# Patient Record
Sex: MALE | Race: WHITE | Employment: FULL TIME | URBAN - METROPOLITAN AREA
[De-identification: names, ages, dates, MRNs, and addresses within clinical notes are randomized per-mention and may not be internally consistent; named-entity substitution may affect disease eponyms.]

---

## 2017-09-21 PROBLEM — N34.2 URETHRITIS: Status: ACTIVE | Noted: 2017-09-21

## 2017-09-21 PROBLEM — N50.82 SCROTAL PAIN: Status: ACTIVE | Noted: 2017-09-21

## 2020-09-11 ENCOUNTER — HOSPITAL ENCOUNTER (OUTPATIENT)
Dept: PHYSICAL THERAPY | Age: 25
Discharge: HOME | End: 2020-09-11
Payer: COMMERCIAL

## 2020-09-11 PROCEDURE — 97161 PT EVAL LOW COMPLEX 20 MIN: CPT

## 2020-09-11 PROCEDURE — 97110 THERAPEUTIC EXERCISES: CPT

## 2020-09-11 PROCEDURE — 97140 MANUAL THERAPY 1/> REGIONS: CPT

## 2020-09-11 NOTE — PROGRESS NOTES
[address] [facility] PHYSICAL THERAPY AT [address] 95 [facility], [address] [place] [address], [address], [address] [place] [address] Ln - Phone: [phone]  Fax: [phone] / 8019 [facility]  Patient Name: Mahendra Grier : 1995   Medical   Diagnosis: Left shoulder pain [M25.512] Treatment Diagnosis: S/p L shoulder posterior stabilization surgery   Onset Date: 20 date of surgery     Referral Source: [facility]* Start of Care [facility]): 2020   Prior Hospitalization: See medical history Provider #: [id number]   Prior Level of Function: Pain/ popping with use of L UE for ADLs   Comorbidities: na   Medications: Verified on Patient Summary List   The Plan of Care and following information is based on the information from the initial evaluation.   ================================================================  Assessment / key information: Mahendra Grier is a 22 y.o.  yo male with Dx of Left shoulder pain [M25.512]. He reports surgery on 20 for posterior shoulder stabilization    Observation: minimal swelling/ bruising. Surgical portals covered with new sterile bandage. Appear well healing.   ROM measures: not assessed due to precautions  MMT measures: not assessed due to precautions  Special tests: na  FOTO score= 29%.  ===========================================================  Eval Complexity: History LOW Complexity : Zero comorbidities / personal factors that will impact the outcome / POC;  Examination  LOW Complexity : 1-2 Standardized tests and measures addressing body structure, function, activity limitation and / or participation in recreation ; Presentation LOW Complexity : Stable, uncomplicated ;  Decision Making MEDIUM Complexity : FOTO score of 26-74; Overall Complexity LOW   Problem List: pain affecting function, decrease ROM, decrease strength, edema affecting function, decrease ADL/ functional abilitiies, decrease activity tolerance and decrease flexibility/ joint mobility   Treatment Plan may include any combination of the following: Therapeutic exercise, Therapeutic activities, Neuromuscular re-education, Physical agent/modality, Manual therapy and Patient education  Patient / Family readiness to learn indicated by: asking questions, trying to perform skills and interest  Persons(s) to be included in education: patient (P)  Barriers to Learning/Limitations: None  Measures taken, if barriers to learning:    Patient Goal (s): Strength and ROM   Patient self reported health status: excellent  Rehabilitation Potential: excellent     Short Term Goals: To be accomplished in  4  weeks:  1 Patient will report >= 25% improvement in symptoms with ADLs  2 Patient will be educated in appropriate ROM exercises/ precautions.  Long Term Goals: To be accomplished in  6-12  weeks:  1 Patient to report >= 75% improvement in symptoms with ADLs. 2 Patient will be independent with finalized HEP/ self maintenance. 3 Increase FOTO score >= 75% to indicate improved function with use of L UE.  4 Restore full AROM for improved ADL participation. 5 increase L UE strength >= 4+/5 for [place] reach and RTW. Frequency / Duration:   Patient to be seen  2  times per week for 6-12  weeks:  Patient / Caregiver education and instruction: self care, activity modification, brace/ splint application and exercises    Therapist Signature: Lizz Alford PT Date: 9/55/2826   Certification Period:  Time: 12:05 PM   ===========================================================  I certify that the above Physical Therapy Services are being furnished while the patient is under my care. I agree with the treatment plan and certify that this therapy is necessary. Physician Signature:        Date:       Time:     Please sign and return to [facility] or you may fax the signed copy to [phone]. Thank you.

## 2020-09-11 NOTE — PROGRESS NOTES
PHYSICAL THERAPY - DAILY TREATMENT NOTE     Patient Name: Manuel Malik        Date: 2020  : 1995   YES Patient  Verified  Visit #:   1     Insurance: Payor: Luciana Province / Plan: Jey Schmid RPN / Product Type: Commerical /      In time: 1130 Out time: 1210   Total Treatment Time: 40     Medicare/BCBS Time Tracking (below)   Total Timed Codes (min):   1:1 Treatment Time:       TREATMENT AREA =  Left shoulder pain [M25.512]    SUBJECTIVE    Pain Level (on 0 to 10 scale):  6  / 10   Medication Changes/New allergies or changes in medical history, any new surgeries or procedures?     NO    If yes, update Summary List   Subjective Functional Status/Changes:  []  No changes reported     See eval /POC         OBJECTIVE  Modalities Rationale:     decrease pain to improve patient's ability to return to PLOF      min [] Estim, type/location:                                      []  att     []  unatt     []  w/US     []  w/ice    []  w/heat    min []  Mechanical Traction: type/lbs                   []  pro   []  sup   []  int   []  cont    []  before manual    []  after manual    min []  Ultrasound, settings/location:      min []  Iontophoresis w/ dexamethasone, location:                                               []  take home patch       []  in clinic   10 min [x]  Ice     []  Heat    location/position:     min []  Vasopneumatic Device, press/temp:     min []  Other:    [x] Skin assessment post-treatment (if applicable):    [x]  intact    [x]  redness- no adverse reaction     []redness  adverse reaction:      10 min Therapeutic Exercise:  [x]  See flow sheet   Rationale:      increase ROM and increase strength to improve the patients ability to return to PLOF     10 min Manual Therapy: Technique:      [x] S/DTM []IASTM []PROM [] Passive Stretching   [x]manual TPR    []Jt manipulation:Gr I [] II []  III [] IV[]  []REIL with manual OP  Treatment Area: scap/ bicep region    Rationale:      decrease pain, increase ROM, increase tissue extensibility and decrease trigger points to improve patient's ability to return to PLOF     min Neuromuscular Re-ed: [x]  See flow sheet   Rationale:      improve coordination, improve balance, increase proprioception and dec dizziness to improve the patients ability to return to PLOF        min Self Care:    Rationale:    increase ROM, increase strength and improve coordination to improve the patients ability to return to PLOF    Billed With/As:   [x] TE   [] TA   [] Neuro   [] Self Care Patient Education: [x] Review HEP    [] Progressed/Changed HEP based on:   [] positioning   [] body mechanics   [] transfers   [] heat/ice application    [] other:        Other Objective/Functional Measures:    See eval/ POC     Post Treatment Pain Level (on 0 to 10) scale:   <6  / 10     ASSESSMENT    X  See POC     PLAN    [x]  Upgrade activities as tolerated {YES) Continue plan of care   []  Discharge due to :    []  Other:      Therapist: Micaela Porter PT    Date: 9/11/2020 Time: 12:03 PM   No future appointments.

## 2020-09-14 ENCOUNTER — HOSPITAL ENCOUNTER (OUTPATIENT)
Dept: PHYSICAL THERAPY | Age: 25
Discharge: HOME | End: 2020-09-14
Payer: COMMERCIAL

## 2020-09-14 PROCEDURE — 97140 MANUAL THERAPY 1/> REGIONS: CPT

## 2020-09-14 PROCEDURE — 97110 THERAPEUTIC EXERCISES: CPT

## 2020-09-14 NOTE — PROGRESS NOTES
PHYSICAL THERAPY - DAILY TREATMENT NOTE     Patient Name: Corinne Armando        Date: 2020  : 1995   YES Patient  Verified  Visit #:   2     Insurance: Payor: Elena Hanley / Plan: Pairs Stone RPN / Product Type: Commerical /      In time: 845 Out time: 920   Total Treatment Time: 35     Medicare/BCBS Time Tracking (below)   Total Timed Codes (min):   1:1 Treatment Time:       TREATMENT AREA =  Left shoulder pain [M25.512]    SUBJECTIVE    Pain Level (on 0 to 10 scale):  4  / 10   Medication Changes/New allergies or changes in medical history, any new surgeries or procedures?     NO    If yes, update Summary List   Subjective Functional Status/Changes:  []  No changes reported   Not too bad           OBJECTIVE  Modalities Rationale:     decrease inflammation and decrease pain to improve patient's ability to perform ADLs as allowed      min [] Estim, type/location:                                      []  att     []  unatt     []  w/US     []  w/ice    []  w/heat    min []  Mechanical Traction: type/lbs                   []  pro   []  sup   []  int   []  cont    []  before manual    []  after manual    min []  Ultrasound, settings/location:      min []  Iontophoresis w/ dexamethasone, location:                                               []  take home patch       []  in clinic   10 min [x]  Ice     []  Heat    location/position:     min []  Vasopneumatic Device, press/temp:     min []  Other:    [x] Skin assessment post-treatment (if applicable):    [x]  intact    [x]  redness- no adverse reaction     []redness  adverse reaction:      15 min Manual Therapy: Technique:      [x] S/DTM []IASTM []PROM [] Passive Stretching   [x]manual TPR  [] SOR [] man traction  []Jt manipulation:Gr I [] II []  III [] IV[]   [] OP with REIL    []   Treatment Area:  L scap/ bicep region   Rationale:      decrease pain, increase ROM, increase tissue extensibility and decrease trigger points to improve patient's ability to perform ADLs as allowed by protocol    10 min Therapeutic Exercise:  [x]  See flow sheet   Rationale:      increase ROM and increase strength to improve the patients ability to perfrom ADLs as allowed by protocol         Billed With/As:   [] TE   [] TA   [] Neuro   [] Self Care Patient Education: [x] Review HEP    [] Progressed/Changed HEP based on:   [] positioning   [] body mechanics   [] transfers   [] heat/ice application    [] other:        Other Objective/Functional Measures: Well healing surgical portals  TP in UT/ biceps    Compliant with precautions   Post Treatment Pain Level (on 0 to 10) scale:   3  / 10     ASSESSMENT  Assessment/Changes in Function:      Functional improvement:  compliant with precautions  Functional limitation:  active use of L shoulder           Patient will continue to benefit from skilled PT services to modify and progress therapeutic interventions, address functional mobility deficits, address ROM deficits, address strength deficits and analyze and address soft tissue restrictions to attain remaining goals.    Progress toward goals / Updated goals:    Good Progress to    [x] STG    [] LTG  2 as shown by pt report/ demo         []  See Progress Note/Recertification    PLAN    [x]  Upgrade activities as tolerated {YES) Continue plan of care   []  Discharge due to :    []  Other:      Therapist: Xu Franco PT    Date: 9/14/2020 Time: 9:09 AM     Future Appointments   Date Time Provider Tammy Bey   9/23/2020  8:00 AM Fraser Krabbe, PT [facility]   9/28/2020  9:30 AM Fraser Krabbe, PT [facility]   9/30/2020 12:00 PM Fraser Krabbe, PT [facility]   10/7/2020  8:45 AM Fraser Krabbe, PT [facility]   10/12/2020  9:30 AM Fraser Krabbe, PT [facility]   10/14/2020  8:45 AM Fraser Krabbe, PT [facility]   10/19/2020  9:30 AM Fraser Krabbe, PT [facility]   10/21/2020  8:45 AM Fraser Krabbe, PT [facility]

## 2020-09-23 ENCOUNTER — HOSPITAL ENCOUNTER (OUTPATIENT)
Dept: PHYSICAL THERAPY | Age: 25
Discharge: HOME | End: 2020-09-23
Payer: COMMERCIAL

## 2020-09-23 PROCEDURE — 97140 MANUAL THERAPY 1/> REGIONS: CPT

## 2020-09-23 PROCEDURE — 97110 THERAPEUTIC EXERCISES: CPT

## 2020-09-23 NOTE — PROGRESS NOTES
PHYSICAL THERAPY - DAILY TREATMENT NOTE     Patient Name: Mahendra Grier        Date: 2020  : 1995   YES Patient  Verified  Visit #:   3    Insurance: Payor: Jaleesa Flores / Plan: Qian SANTIAGO / Product Type: Commerical /      In time: 800 Out time: 835   Total Treatment Time: 35     Medicare/BCBS Time Tracking (below)   Total Timed Codes (min):   1:1 Treatment Time:       TREATMENT AREA =  Left shoulder pain [M25.512]    SUBJECTIVE    Pain Level (on 0 to 10 scale):  1  / 10   Medication Changes/New allergies or changes in medical history, any new surgeries or procedures? NO    If yes, update Summary List   Subjective Functional Status/Changes:  []  No changes reported   Saw PA 2 days ago.   Able to come out of the sling at home when lounging           OBJECTIVE  Modalities Rationale:     decrease inflammation and decrease pain to improve patient's ability to perform ADLs with dec pain      min [] Estim, type/location:                                      []  att     []  unatt     []  w/US     []  w/ice    []  w/heat    min []  Mechanical Traction: type/lbs                   []  pro   []  sup   []  int   []  cont    []  before manual    []  after manual    min []  Ultrasound, settings/location:      min []  Iontophoresis w/ dexamethasone, location:                                               []  take home patch       []  in clinic   10 min [x]  Ice     []  Heat    location/position:     min []  Vasopneumatic Device, press/temp:     min []  Other:    [x] Skin assessment post-treatment (if applicable):    [x]  intact    []  redness- no adverse reaction     []redness  adverse reaction:      15 min Manual Therapy: Technique:      [x] S/DTM []IASTM []PROM [] Passive Stretching   [x]manual TPR  [] SOR [] man traction  []Jt manipulation:Gr I [] II []  III [] IV[]   [] OP with REIL    []   Treatment Area:  L shoulder area   Rationale:      decrease pain, increase ROM, increase tissue extensibility and decrease trigger points to improve patient's ability to perform ADLs as allowed    10 min Therapeutic Exercise:  [x]  See flow sheet   Rationale:      increase ROM and increase strength to improve the patients ability to use arm as allowed         Billed With/As:   [x] TE   [] TA   [] Neuro   [] Self Care Patient Education: [x] Review HEP    [] Progressed/Changed HEP based on:   [] positioning   [] body mechanics   [] transfers   [] heat/ice application    [] other:        Other Objective/Functional Measures:    Review precautions and ex progression protocol    Multiple TP- UT/ scap/ upper arm   Post Treatment Pain Level (on 0 to 10) scale:   0  / 10     ASSESSMENT  Assessment/Changes in Function:      Functional improvement:  dec pain intensity; compliant with protocol  Functional limitation:  no reach/ lift using L shoulder           Patient will continue to benefit from skilled PT services to modify and progress therapeutic interventions, address functional mobility deficits, address ROM deficits, address strength deficits and analyze and address soft tissue restrictions to attain remaining goals.    Progress toward goals / Updated goals:    Good compliance with precautions         []  See Progress Note/Recertification    PLAN    [x]  Upgrade activities as tolerated {YES) Continue plan of care   []  Discharge due to :    []  Other:      Therapist: Treasure Stanley PT    Date: 9/23/2020 Time: 8:01 AM     Future Appointments   Date Time Provider Tammy Bey   9/28/2020  9:30 AM Andriy Burrows, PT [facility]   9/30/2020 12:00 PM Andriy Burrows, PT [facility]   10/7/2020  8:45 AM Arch Stormy, PT [facility]   10/12/2020  9:30 AM Arch Stormy, PT [facility]   10/14/2020  8:45 AM Arch Stormy, PT [facility]   10/19/2020  9:30 AM Arch Stormy, PT [facility]   10/21/2020  8:45 AM Andriy Burrows, PT [facility]

## 2020-09-28 ENCOUNTER — APPOINTMENT (OUTPATIENT)
Dept: PHYSICAL THERAPY | Age: 25
End: 2020-09-28
Payer: COMMERCIAL

## 2020-09-30 ENCOUNTER — HOSPITAL ENCOUNTER (OUTPATIENT)
Dept: PHYSICAL THERAPY | Age: 25
Discharge: HOME | End: 2020-09-30
Payer: COMMERCIAL

## 2020-09-30 PROCEDURE — 97110 THERAPEUTIC EXERCISES: CPT

## 2020-09-30 PROCEDURE — 97140 MANUAL THERAPY 1/> REGIONS: CPT

## 2020-09-30 NOTE — PROGRESS NOTES
PHYSICAL THERAPY - DAILY TREATMENT NOTE     Patient Name: Mila Soliz        Date: 2020  : 1995   YES Patient  Verified  Visit #:   4     Insurance: Payor: Shay Castro / Plan: Gianni SANTIAGO / Product Type: Commerical /      In time: 1200 Out time: 5133   Total Treatment Time: 35     Medicare/[facility] Time Tracking (below)   Total Timed Codes (min):   1:1 Treatment Time:       TREATMENT AREA =  Left shoulder pain [M25.512]    SUBJECTIVE    Pain Level (on 0 to 10 scale):  0  / 10   Medication Changes/New allergies or changes in medical history, any new surgeries or procedures?     NO    If yes, update Summary List   Subjective Functional Status/Changes:  []  No changes reported   Really no pain           OBJECTIVE  Modalities Rationale:     decrease edema, decrease inflammation and decrease pain to improve patient's ability to perform ADLs with less pain      min [] Estim, type/location:                                      []  att     []  unatt     []  w/US     []  w/ice    []  w/heat    min []  Mechanical Traction: type/lbs                   []  pro   []  sup   []  int   []  cont    []  before manual    []  after manual    min []  Ultrasound, settings/location:      min []  Iontophoresis w/ dexamethasone, location:                                               []  take home patch       []  in clinic   10 min [x]  Ice     []  Heat    location/position:     min []  Vasopneumatic Device, press/temp:     min []  Other:    [x] Skin assessment post-treatment (if applicable):    [x]  intact    [x]  redness- no adverse reaction     []redness  adverse reaction:      15 min Manual Therapy: Technique:      [x] S/DTM []IASTM []PROM [x] Passive Stretching   [x]manual TPR  [] SOR [] man traction  []Jt manipulation:Gr I [] II []  III [] IV[]   [] OP with REIL    []   Treatment Area:  L shoulder PROM within guidelines, STM to med scap/ bicep   Rationale:      decrease pain, increase ROM, increase tissue extensibility and decrease trigger points to improve patient's ability to reach as allowed    10 min Therapeutic Exercise:  [x]  See flow sheet- guidelines   Rationale:      increase ROM and increase strength to improve the patients ability to use UE as allowed by protocol         x min Self Care:    Rationale:    allow healing to improve the patients ability to use arm as allowed by protocol    Billed With/As:   [] TE   [] TA   [] Neuro   [] Self Care Patient Education: [x] Review HEP    [] Progressed/Changed HEP based on:   [] positioning   [] body mechanics   [] transfers   [] heat/ice application    [] other:        Other Objective/Functional Measures:    Easily reaches PROM 90 deg flex/ scap, 45 deg ER   Post Treatment Pain Level (on 0 to 10) scale:   0  / 10     ASSESSMENT  Assessment/Changes in Function:      Functional improvement:  good tolerance for treatment/ good PROM  Functional limitation:  no active use L shoulder           Patient will continue to benefit from skilled PT services to modify and progress therapeutic interventions, address functional mobility deficits, address ROM deficits, address strength deficits, analyze and address soft tissue restrictions and analyze and cue movement patterns to attain remaining goals.    Progress toward goals / Updated goals:    Good Progress to    Progress PROM within guidelines         []  See Progress Note/Recertification    PLAN    [x]  Upgrade activities as tolerated {YES) Continue plan of care   []  Discharge due to :    []  Other:      Therapist: Sofía Barber PT    Date: 9/30/2020 Time: 12:01 PM     Future Appointments   Date Time Provider Tammy Bey   10/7/2020  8:45 AM Olena Feliz, PT [facility]   10/12/2020  9:30 AM Olena Feliz, PT [facility]   10/14/2020  8:45 AM Olena Feliz, PT [facility]   10/19/2020  9:30 AM Olena Feliz, PT [facility]   10/21/2020  8:45 AM Olena Feliz, PT [facility]

## 2020-10-07 ENCOUNTER — HOSPITAL ENCOUNTER (OUTPATIENT)
Dept: PHYSICAL THERAPY | Age: 25
Discharge: HOME | End: 2020-10-07
Payer: COMMERCIAL

## 2020-10-07 PROCEDURE — 97140 MANUAL THERAPY 1/> REGIONS: CPT

## 2020-10-07 PROCEDURE — 97110 THERAPEUTIC EXERCISES: CPT

## 2020-10-07 NOTE — PROGRESS NOTES
PHYSICAL THERAPY - DAILY TREATMENT NOTE     Patient Name: Erik Bernabe        Date: 10/7/2020  : 1995   YES Patient  Verified  Visit #:   5     Insurance: Payor: Shannon Flynn / Plan: Renetta Carlson RPN / Product Type: Commerical /      In time: 845 Out time: 920   Total Treatment Time: 35     Medicare/[facility] Time Tracking (below)   Total Timed Codes (min):   1:1 Treatment Time:       TREATMENT AREA =  Left shoulder pain [M25.512]    SUBJECTIVE    Pain Level (on 0 to 10 scale):  0  / 10   Medication Changes/New allergies or changes in medical history, any new surgeries or procedures?     NO    If yes, update Summary List   Subjective Functional Status/Changes:  []  No changes reported   Feeling great           OBJECTIVE  Modalities Rationale:     decrease inflammation and decrease pain to improve patient's ability to perform ADLs without pain/ soreness      min [] Estim, type/location:                                      []  att     []  unatt     []  w/US     []  w/ice    []  w/heat    min []  Mechanical Traction: type/lbs                   []  pro   []  sup   []  int   []  cont    []  before manual    []  after manual    min []  Ultrasound, settings/location:      min []  Iontophoresis w/ dexamethasone, location:                                               []  take home patch       []  in clinic   10 min [x]  Ice     []  Heat    location/position:     min []  Vasopneumatic Device, press/temp:     min []  Other:    [] Skin assessment post-treatment (if applicable):    []  intact    []  redness- no adverse reaction     []redness  adverse reaction:      15 min Manual Therapy: Technique:      [] S/DTM []IASTM []PROM [x] Passive Stretching   [x]manual TPR  [] SOR [] man traction  []Jt manipulation:Gr I [] II []  III [] IV[]   [] OP with REIL    []   Treatment Area:  L shoulder/ scap region   Rationale:      decrease pain, increase ROM, increase tissue extensibility and decrease trigger points to improve patient's ability to use UE as allowed by protocol    10 min Therapeutic Exercise:  [x]  See flow sheet   Rationale:      increase ROM and increase strength to improve the patients ability to uese UE for ADLs as allowed by protocol         x min Self Care: Review precautions/ HEP   Rationale:    increase ROM, increase strength and protect surgery to improve the patients ability to use UE for ADLs    Billed With/As:   [] TE   [] TA   [] Neuro   [] Self Care Patient Education: [x] Review HEP    [] Progressed/Changed HEP based on:   [] positioning   [] body mechanics   [] transfers   [] heat/ice application    [] other:        Other Objective/Functional Measures:    Demos good understanding of precAUTIONS    Easily achieves PROM limits of 90 flex/ abd, 45 IR   Post Treatment Pain Level (on 0 to 10) scale:   0  / 10     ASSESSMENT  Assessment/Changes in Function:      Functional improvement:  good flexibility/ no pain  Functional limitation:  no active use of L UE           Patient will continue to benefit from skilled PT services to modify and progress therapeutic interventions, address functional mobility deficits, address ROM deficits, address strength deficits, analyze and address soft tissue restrictions and analyze and cue movement patterns to attain remaining goals.    Progress toward goals / Updated goals:    Good mobility this stage post op         []  See Progress Note/Recertification    PLAN    [x]  Upgrade activities as tolerated {YES) Continue plan of care   []  Discharge due to :    []  Other:      Therapist: Ja Gomez PT    Date: 10/7/2020 Time: 8:46 AM     Future Appointments   Date Time Provider Tammy Bey   10/14/2020  8:45 AM Raul Tavarez PT [facility]   10/21/2020  8:45 AM Raul Tavarez PT [facility]   10/23/2020 12:00 PM Raul Tavarez PT [facility]   10/26/2020  9:30 AM Raul Tavarez PT [facility]   10/28/2020 10:30 AM Raul Tavarez PT [facility]   11/2/2020 10:15 AM Kristina Huston, PT [facility]   11/4/2020 10:30 AM Kristina Huston, PT [facility]   11/9/2020 10:15 AM Kristina Huston, PT [facility]   11/11/2020 10:30 AM Kristina Huston, PT [facility]

## 2020-10-12 ENCOUNTER — APPOINTMENT (OUTPATIENT)
Dept: PHYSICAL THERAPY | Age: 25
End: 2020-10-12
Payer: COMMERCIAL

## 2020-10-14 ENCOUNTER — HOSPITAL ENCOUNTER (OUTPATIENT)
Dept: PHYSICAL THERAPY | Age: 25
Discharge: HOME | End: 2020-10-14
Payer: COMMERCIAL

## 2020-10-14 PROCEDURE — 97110 THERAPEUTIC EXERCISES: CPT

## 2020-10-14 PROCEDURE — 97140 MANUAL THERAPY 1/> REGIONS: CPT

## 2020-10-14 NOTE — PROGRESS NOTES
[address] [facility] PHYSICAL THERAPY AT [address] [facility], [address] [place] [address], [address], [address] [place] [address]  Phone: [phone]  Fax: [phone]  PROGRESS NOTE  Patient Name: Adolfo Echeverria : 1995   Treatment/Medical Diagnosis: Left shoulder pain [M25.512]   Date of surgery= 20   Referral Source: Andrea Lopes*     Date of Initial Visit: 20 Attended Visits: 6 Missed Visits: -     SUMMARY OF TREATMENT  PT has consisted of manual therapy and therapeutic exercises as outlined in surgical protocol; patient education of HEP and surgical precautions  CURRENT STATUS  Patient is progressing well through this course of PT. He reports no pain. He demonstrates/ reports compliance with precautions. He easily attains PROM as allowed by restrictions (phase 1)     Previous Goals:  1 Patient will report >= 25% improvement in symptoms with ADLs  2 Patient will be educated in appropriate ROM exercises/ precautions. Prior Level/Current Level:  1) Prior Level: na   Current Level: 50%   Goal Met? yes  2) Prior Level: na   Current Level: indep / compliant   Goal Met? yes      New Goals to be achieved in __4-8__  weeks:  1 Patient to report >= 75% improvement in symptoms with ADLs. 2 Patient will be independent with finalized HEP/ self maintenance. 3 Increase FOTO score >= 75% to indicate improved function with use of L UE.  4 Restore full AROM for improved ADL participation. 5 increase L UE strength >= 4+/5 for OH reach and RTW. Assessments/Recommendations: Other: continue PT per current POC as directed by MD/ surgical protocol    If you have any questions/comments please contact us directly at [phone]. Thank you for allowing us to assist in the care of your patient.     Therapist Signature: Alicia De Anda PT Date: 10/14/2020   Reporting Period:  Certification Period:    Time: 9:14 AM   NOTE TO PHYSICIAN:  PLEASE COMPLETE THE ORDERS BELOW AND FAX TO   [facility] at [address]: [phone]. If you are unable to process this request in 24 hours please contact our office: [phone].    ___ I have read the above report and request that my patient continue as recommended.   ___ I have read the above report and request that my patient continue therapy with the following changes/special instructions:_________________________________________   ___ I have read the above report and request that my patient be discharged from therapy.      Physician Signature:        Date:       Time:

## 2020-10-14 NOTE — PROGRESS NOTES
PHYSICAL THERAPY - DAILY TREATMENT NOTE     Patient Name: Dorann Lesch        Date: 10/14/2020  : 1995   YES Patient  Verified  Visit #:     Insurance: Payor: Mukund Joyner / Plan: Snow SANTIAGO / Product Type: Commerical /      In time: 845 Out time: 920   Total Treatment Time: 35     Medicare/[facility] Time Tracking (below)   Total Timed Codes (min):   1:1 Treatment Time:       TREATMENT AREA =  Left shoulder pain [M25.512]    SUBJECTIVE    Pain Level (on 0 to 10 scale):  0  / 10   Medication Changes/New allergies or changes in medical history, any new surgeries or procedures? NO    If yes, update Summary List   Subjective Functional Status/Changes:  []  No changes reported   50% improved.   No pain, just not using the arm           OBJECTIVE  Modalities Rationale:     decrease inflammation and decrease pain to improve patient's ability to use arm as allowed      min [] Estim, type/location:                                      []  att     []  unatt     []  w/US     []  w/ice    []  w/heat    min []  Mechanical Traction: type/lbs                   []  pro   []  sup   []  int   []  cont    []  before manual    []  after manual    min []  Ultrasound, settings/location:      min []  Iontophoresis w/ dexamethasone, location:                                               []  take home patch       []  in clinic   10 min [x]  Ice     []  Heat    location/position:     min []  Vasopneumatic Device, press/temp:     min []  Other:    [x] Skin assessment post-treatment (if applicable):    [x]  intact    [x]  redness- no adverse reaction     []redness  adverse reaction:      15 min Manual Therapy: Technique:      [x] S/DTM []IASTM []PROM [x] Passive Stretching   [x]manual TPR  [] SOR [] man traction  []Jt manipulation:Gr I [] II []  III [] IV[]   [] OP with REIL    []   Treatment Area:  L shoulder   Rationale:      decrease pain, increase ROM, increase tissue extensibility and decrease trigger points to improve patient's ability to use arm as allowed by protocol    10 min Therapeutic Exercise:  [x]  See flow sheet   Rationale:      increase ROM and increase strength to improve the patients ability to use arm as allowed by protocol         Billed With/As:   [] TE   [] TA   [] Neuro   [] Self Care Patient Education: [x] Review HEP    [] Progressed/Changed HEP based on:   [] positioning   [] body mechanics   [] transfers   [] heat/ice application    [] other:        Other Objective/Functional Measures:    See PN   Post Treatment Pain Level (on 0 to 10) scale:   0  / 10     ASSESSMENT    [x]  See Progress Note/Recertification    PLAN    [x]  Upgrade activities as tolerated {YES) Continue plan of care   []  Discharge due to :    []  Other:      Therapist: Torito West PT    Date: 10/14/2020 Time: 8:46 AM     Future Appointments   Date Time Provider Tammy Bey   10/21/2020  8:45 AM Kristina Huston, PT [facility]   10/23/2020 12:00 PM Kristina Huston, [address] [facility]   10/26/2020  9:30 AM Kristina Huston, PT [facility]   10/28/2020 10:30 AM Kristina Huston, PT [facility]   11/2/2020 10:15 AM Kristina Huston, PT [facility]   11/4/2020 10:30 AM Kristina Huston PT [facility]   11/9/2020 10:15 AM Kristina Huston, PT [facility]   11/11/2020 10:30 AM Kristina Huston, PT [facility]

## 2020-10-19 ENCOUNTER — APPOINTMENT (OUTPATIENT)
Dept: PHYSICAL THERAPY | Age: 25
End: 2020-10-19
Payer: COMMERCIAL

## 2020-10-21 ENCOUNTER — HOSPITAL ENCOUNTER (OUTPATIENT)
Dept: PHYSICAL THERAPY | Age: 25
Discharge: HOME | End: 2020-10-21
Payer: COMMERCIAL

## 2020-10-21 PROCEDURE — 97140 MANUAL THERAPY 1/> REGIONS: CPT

## 2020-10-21 PROCEDURE — 97110 THERAPEUTIC EXERCISES: CPT

## 2020-10-21 NOTE — PROGRESS NOTES
PHYSICAL THERAPY - DAILY TREATMENT NOTE     Patient Name: Asif Romano        Date: 10/21/2020  : 1995   YES Patient  Verified  Visit #:     Insurance: Payor: Marilyne Fabry / Plan: Osvaldo SANTIAGO / Product Type: Commerical /      In time: 845 Out time: 227   Total Treatment Time: 50     Medicare/[employer] Time Tracking (below)   Total Timed Codes (min):   1:1 Treatment Time:       TREATMENT AREA =  Left shoulder pain [M25.512]    SUBJECTIVE    Pain Level (on 0 to 10 scale):  0  / 10   Medication Changes/New allergies or changes in medical history, any new surgeries or procedures? NO    If yes, update Summary List   Subjective Functional Status/Changes:  []  No changes reported   Saw MD.  Everything looks good. To RTW in 1 week.   No lifting > 1#           OBJECTIVE  Modalities Rationale:     decrease inflammation and decrease pain to improve patient's ability to perform ADLs      min [] Estim, type/location:                                      []  att     []  unatt     []  w/US     []  w/ice    []  w/heat    min []  Mechanical Traction: type/lbs                   []  pro   []  sup   []  int   []  cont    []  before manual    []  after manual    min []  Ultrasound, settings/location:      min []  Iontophoresis w/ dexamethasone, location:                                               []  take home patch       []  in clinic   10 min [x]  Ice     []  Heat    location/position:     min []  Vasopneumatic Device, press/temp:     min []  Other:    [] Skin assessment post-treatment (if applicable):    []  intact    []  redness- no adverse reaction     []redness  adverse reaction:      15 min Manual Therapy: Technique:      [x] S/DTM []IASTM []PROM [x] Passive Stretching   [x]manual TPR  [] SOR [] man traction  []Jt manipulation:Gr I [] II []  III [] IV[]   [] OP with REIL    []   Treatment Area:  L shoulder   Rationale:      decrease pain, increase ROM, increase tissue extensibility and decrease trigger points to improve patient's ability to reach/ lift/ perform ADLs    25 min Therapeutic Exercise:  [x]  See flow sheet   Rationale:      increase ROM and increase strength to improve the patients ability to perform ADLs         Billed With/As:   [x] TE   [] TA   [] Neuro   [] Self Care Patient Education: [x] Review HEP    [] Progressed/Changed HEP based on:   [] positioning   [] body mechanics   [] transfers   [] heat/ice application    [] other:        Other Objective/Functional Measures:    Progressed AAROM, scap strength as noted on flow sheet   Post Treatment Pain Level (on 0 to 10) scale:   0  / 10     ASSESSMENT  Assessment/Changes in Function:      Functional improvement:  progressed there ex--no increase in pain          Patient will continue to benefit from skilled PT services to modify and progress therapeutic interventions, address functional mobility deficits, address ROM deficits, address strength deficits and analyze and address soft tissue restrictions to attain remaining goals.    Progress toward goals / Updated goals:    Good Progress to    [] STG    [] LTG  2 as shown by progress in HEP         []  See Progress Note/Recertification    PLAN    [x]  Upgrade activities as tolerated {YES) Continue plan of care   []  Discharge due to :    []  Other:      Therapist: Torito West, PT    Date: 10/21/2020 Time: 8:49 AM     Future Appointments   Date Time Provider Tammy Bey   10/23/2020 12:00 PM Kristina Huston PT [facility]   10/26/2020  9:30 AM Kristina Huston PT [facility]   10/28/2020 10:30 AM Kristina Huston PT [facility]   11/2/2020 10:15 AM Kristina Huston PT [facility]   11/4/2020 10:30 AM Kristina Huston, PT [facility]   11/9/2020 10:15 AM Kristina Huston PT [facility]   11/11/2020 10:30 AM Kristina Huston, PT [facility]

## 2020-10-23 ENCOUNTER — HOSPITAL ENCOUNTER (OUTPATIENT)
Dept: PHYSICAL THERAPY | Age: 25
Discharge: HOME | End: 2020-10-23
Payer: COMMERCIAL

## 2020-10-23 PROCEDURE — 97110 THERAPEUTIC EXERCISES: CPT

## 2020-10-23 PROCEDURE — 97140 MANUAL THERAPY 1/> REGIONS: CPT

## 2020-10-23 NOTE — PROGRESS NOTES
PHYSICAL THERAPY - DAILY TREATMENT NOTE     Patient Name: Juan Carlos Fajardo        Date: 10/23/2020  : 1995   YES Patient  Verified  Visit #:     Insurance: Payor: Vish Patrick / Plan: Patric SANTIAGO / Product Type: Commerical /      In time: 1200 Out time: 54   Total Treatment Time: 45     Medicare/[employer] Time Tracking (below)   Total Timed Codes (min):   1:1 Treatment Time:       TREATMENT AREA =  Left shoulder pain [M25.512]    SUBJECTIVE    Pain Level (on 0 to 10 scale):  0  / 10   Medication Changes/New allergies or changes in medical history, any new surgeries or procedures?     NO    If yes, update Summary List   Subjective Functional Status/Changes:  []  No changes reported   Mild soreness after last visit from new ex's           OBJECTIVE  Modalities Rationale:     decrease inflammation and decrease pain to improve patient's ability to perform ADLs      min [] Estim, type/location:                                      []  att     []  unatt     []  w/US     []  w/ice    []  w/heat    min []  Mechanical Traction: type/lbs                   []  pro   []  sup   []  int   []  cont    []  before manual    []  after manual    min []  Ultrasound, settings/location:      min []  Iontophoresis w/ dexamethasone, location:                                               []  take home patch       []  in clinic   10 min [x]  Ice     []  Heat    location/position:     min []  Vasopneumatic Device, press/temp:     min []  Other:    [x] Skin assessment post-treatment (if applicable):    [x]  intact    [x]  redness- no adverse reaction     []redness  adverse reaction:      10 min Manual Therapy: Technique:      [x] S/DTM []IASTM []PROM [x] Passive Stretching   [x]manual TPR  [] SOR [] man traction  []Jt manipulation:Gr I [] II []  III [] IV[]   [] OP with REIL    []   Treatment Area:  L shoulder   Rationale:      decrease pain, increase ROM, increase tissue extensibility and decrease trigger points to improve patient's ability to perform ADLs as allowed with less pain    25 min Therapeutic Exercise:  [x]  See flow sheet   Rationale:      increase ROM and increase strength to improve the patients ability to perfrom ADLs as allowed with less pain         Billed With/As:   [x] TE   [] TA   [] Neuro   [] Self Care Patient Education: [x] Review HEP    [] Progressed/Changed HEP based on:   [] positioning   [] body mechanics   [] transfers   [] heat/ice application    [] other:        Other Objective/Functional Measures:    AROM flex= 164   Post Treatment Pain Level (on 0 to 10) scale:   0  / 10     ASSESSMENT  Assessment/Changes in Function:      Functional improvement:  used of L shoulder for non resisted activity  Functional limitation:  lifting           Patient will continue to benefit from skilled PT services to modify and progress therapeutic interventions, address functional mobility deficits, address ROM deficits, address strength deficits, analyze and address soft tissue restrictions and analyze and cue movement patterns to attain remaining goals.    Progress toward goals / Updated goals:    Good Progress to    [] STG    [x] LTG  4 as shown by observation         []  See Progress Note/Recertification    PLAN    [x]  Upgrade activities as tolerated {YES) Continue plan of care   []  Discharge due to :    []  Other:      Therapist: Jaquelin Vela PT    Date: 10/23/2020 Time: 12:04 PM     Future Appointments   Date Time Provider Tammy Bey   10/26/2020  9:30 AM Josh Wiley, PT [facility]   10/28/2020 10:30 AM Josh Wiley, PT [facility]   11/2/2020 10:15 AM Josh Wiley, PT [facility]   11/4/2020 10:30 AM Josh Wiley, PT [facility]   11/9/2020 10:15 AM Josh Wiley, PT [facility]   11/11/2020 10:30 AM Josh Wiley, PT [facility]

## 2020-10-26 ENCOUNTER — HOSPITAL ENCOUNTER (OUTPATIENT)
Dept: PHYSICAL THERAPY | Age: 25
Discharge: HOME | End: 2020-10-26
Payer: COMMERCIAL

## 2020-10-26 PROCEDURE — 97140 MANUAL THERAPY 1/> REGIONS: CPT

## 2020-10-26 PROCEDURE — 97110 THERAPEUTIC EXERCISES: CPT

## 2020-10-26 NOTE — PROGRESS NOTES
PHYSICAL THERAPY - DAILY TREATMENT NOTE     Patient Name: Leon Mitchell        Date: 10/26/2020  : 1995   YES Patient  Verified  Visit #:     Insurance: Payor: Lisandro Peoples / Plan: Jean-Pierre Cruz RPN / Product Type: Commerical /      In time: 930 Out time: 1020   Total Treatment Time: 50     Medicare/[employer] Time Tracking (below)   Total Timed Codes (min):   1:1 Treatment Time:       TREATMENT AREA =  Left shoulder pain [M25.512]    SUBJECTIVE    Pain Level (on 0 to 10 scale):  0  / 10   Medication Changes/New allergies or changes in medical history, any new surgeries or procedures? NO    If yes, update Summary List   Subjective Functional Status/Changes:  []  No changes reported   Doing well.   RTW today- if scheduled           OBJECTIVE  Modalities Rationale:     decrease inflammation and decrease pain to improve patient's ability to perform ADLs/ RTW      min [] Estim, type/location:                                      []  att     []  unatt     []  w/US     []  w/ice    []  w/heat    min []  Mechanical Traction: type/lbs                   []  pro   []  sup   []  int   []  cont    []  before manual    []  after manual    min []  Ultrasound, settings/location:      min []  Iontophoresis w/ dexamethasone, location:                                               []  take home patch       []  in clinic   10 min [x]  Ice     []  Heat    location/position:     min []  Vasopneumatic Device, press/temp:     min []  Other:    [x] Skin assessment post-treatment (if applicable):    [x]  intact    []  redness- no adverse reaction     []redness  adverse reaction:      15 min Manual Therapy: Technique:      [x] S/DTM []IASTM []PROM [x] Passive Stretching   [x]manual TPR  [] SOR [] man traction  []Jt manipulation:Gr I [] II []  III [] IV[]   [] OP with REIL    []   Treatment Area:   L shoulder   Rationale:      decrease pain, increase ROM, increase tissue extensibility and decrease trigger points to improve patient's ability to perform ADLs/ RTW    25 min Therapeutic Exercise:  [x]  See flow sheet   Rationale:      increase ROM and increase strength to improve the patients ability to return to ADLs/ RTW         Billed With/As:   [] TE   [] TA   [] Neuro   [] Self Care Patient Education: [x] Review HEP    [] Progressed/Changed HEP based on:   [] positioning   [] body mechanics   [] transfers   [] heat/ice application    [] other:        Other Objective/Functional Measures: Added TB ER/ IR   Post Treatment Pain Level (on 0 to 10) scale:   0  / 10     ASSESSMENT  Assessment/Changes in Function:      Functional improvement:  RTW today  Functional limitation:  lifting/ resistive activity restrictions           Patient will continue to benefit from skilled PT services to modify and progress therapeutic interventions, address functional mobility deficits, address ROM deficits, address strength deficits, analyze and address soft tissue restrictions and analyze and cue movement patterns to attain remaining goals.    Progress toward goals / Updated goals:    Good Progress to    [] STG    [] LTG  4 as shown by observation         []  See Progress Note/Recertification    PLAN    [x]  Upgrade activities as tolerated {YES) Continue plan of care   []  Discharge due to :    []  Other:      Therapist: Lourena Sandhoff, PT    Date: 10/26/2020 Time: 9:39 AM     Future Appointments   Date Time Provider Tammy Bey   10/28/2020 10:30 AM Diego Winston PT [facility]   11/2/2020 10:15 AM Diego Winston PT [facility]   11/4/2020 10:30 AM Diego Winston PT [facility]   11/9/2020 10:15 AM Diego Winston PT [facility]   11/11/2020 10:30 AM Diego Winston PT [facility]

## 2020-10-28 ENCOUNTER — HOSPITAL ENCOUNTER (OUTPATIENT)
Dept: PHYSICAL THERAPY | Age: 25
Discharge: HOME | End: 2020-10-28
Payer: COMMERCIAL

## 2020-10-28 PROCEDURE — 97140 MANUAL THERAPY 1/> REGIONS: CPT

## 2020-10-28 PROCEDURE — 97110 THERAPEUTIC EXERCISES: CPT

## 2020-10-28 NOTE — PROGRESS NOTES
PHYSICAL THERAPY - DAILY TREATMENT NOTE     Patient Name: Parisa Dennis        Date: 10/28/2020  : 1995   YES Patient  Verified  Visit #:   10   of   18  Insurance: Payor: Shon Perkins / Plan: Perico SANTIAGO / Product Type: Commerical /      In time: 3716 Out time: 1120   Total Treatment Time: 50     Medicare/BCBS Time Tracking (below)   Total Timed Codes (min):   1:1 Treatment Time:       TREATMENT AREA =  Left shoulder pain [M25.512]    SUBJECTIVE    Pain Level (on 0 to 10 scale):  0  / 10   Medication Changes/New allergies or changes in medical history, any new surgeries or procedures?     NO    If yes, update Summary List   Subjective Functional Status/Changes:  []  No changes reported   Doing well  RTW without difficulty         OBJECTIVE  Modalities Rationale:     decrease inflammation and decrease pain to improve patient's ability to perform ADLs/ work activity      min [] Estim, type/location:                                      []  att     []  unatt     []  w/US     []  w/ice    []  w/heat    min []  Mechanical Traction: type/lbs                   []  pro   []  sup   []  int   []  cont    []  before manual    []  after manual    min []  Ultrasound, settings/location:      min []  Iontophoresis w/ dexamethasone, location:                                               []  take home patch       []  in clinic   10 min [x]  Ice     []  Heat    location/position:     min []  Vasopneumatic Device, press/temp:     min []  Other:    [] Skin assessment post-treatment (if applicable):    []  intact    []  redness- no adverse reaction     []redness  adverse reaction:      10 min Manual Therapy: Technique:      [x] S/DTM []IASTM []PROM [x] Passive Stretching   [x]manual TPR  [] SOR [] man traction  []Jt manipulation:Gr I [] II []  III [] IV[]   [] OP with REIL    []   Treatment Area: L shoulder    Rationale:      decrease pain, increase ROM, increase tissue extensibility and decrease trigger points to improve patient's ability to perform ADLs/ work activity    30 min Therapeutic Exercise:  [x]  See flow sheet   Rationale:      increase ROM and increase strength to improve the patients ability to perform works activty/ ADLs       Billed With/As:   [] TE   [] TA   [] Neuro   [] Self Care Patient Education: [x] Review HEP    [] Progressed/Changed HEP based on:   [] positioning   [] body mechanics   [] transfers   [] heat/ice application    [] other:        Other Objective/Functional Measures: Added prone T, I for scap stability   Post Treatment Pain Level (on 0 to 10) scale:   0  / 10     ASSESSMENT  Assessment/Changes in Function:      Functional improvement:  RTW  Functional limitation:  no lifting           Patient will continue to benefit from skilled PT services to modify and progress therapeutic interventions, address functional mobility deficits, address ROM deficits, address strength deficits, analyze and address soft tissue restrictions and analyze and cue movement patterns to attain remaining goals.    Progress toward goals / Updated goals:    RTW without difficulty         []  See Progress Note/Recertification    PLAN    [x]  Upgrade activities as tolerated {YES) Continue plan of care   []  Discharge due to :    []  Other:      Therapist: Kimberli Schmidt PT    Date: 10/28/2020 Time: 10:34 AM     Future Appointments   Date Time Provider Tammy Bey   11/2/2020 10:15 AM Lady Ugarte, PT [facility]   11/4/2020 10:30 AM Lady Ugarte, PT [facility]   11/9/2020 10:15 AM Lady Ugarte, PT [facility]   11/11/2020 10:30 AM Lady Ugarte, PT [facility]

## 2020-11-02 ENCOUNTER — HOSPITAL ENCOUNTER (OUTPATIENT)
Dept: PHYSICAL THERAPY | Age: 25
Discharge: HOME | End: 2020-11-02
Payer: COMMERCIAL

## 2020-11-02 PROCEDURE — 97140 MANUAL THERAPY 1/> REGIONS: CPT

## 2020-11-02 PROCEDURE — 97110 THERAPEUTIC EXERCISES: CPT

## 2020-11-02 NOTE — PROGRESS NOTES
PHYSICAL THERAPY - DAILY TREATMENT NOTE     Patient Name: Noelle Dakin        Date: 2020  : 1995   YES Patient  Verified  Visit #:     Insurance: Payor: Harsha Phillips / Plan: Lieutenant Marques RPN / Product Type: Commerical /      In time: 1020 Out time: 1110   Total Treatment Time: 50     Medicare/[facility] Time Tracking (below)   Total Timed Codes (min):   1:1 Treatment Time:       TREATMENT AREA =  Left shoulder pain [M25.512]    SUBJECTIVE    Pain Level (on 0 to 10 scale):  0  / 10   Medication Changes/New allergies or changes in medical history, any new surgeries or procedures? NO    If yes, update Summary List   Subjective Functional Status/Changes:  []  No changes reported   No pain.   RTW without difficulty           OBJECTIVE  Modalities Rationale:     decrease inflammation and decrease pain to improve patient's ability to perform ADLs       min [] Estim, type/location:                                      []  att     []  unatt     []  w/US     []  w/ice    []  w/heat    min []  Mechanical Traction: type/lbs                   []  pro   []  sup   []  int   []  cont    []  before manual    []  after manual    min []  Ultrasound, settings/location:      min []  Iontophoresis w/ dexamethasone, location:                                               []  take home patch       []  in clinic   10 min [x]  Ice     []  Heat    location/position:     min []  Vasopneumatic Device, press/temp:     min []  Other:    [x] Skin assessment post-treatment (if applicable):    [x]  intact    []  redness- no adverse reaction     []redness  adverse reaction:      15 min Manual Therapy: Technique:      [x] S/DTM []IASTM [x]PROM [x] Passive Stretching   [x]manual TPR  [] SOR [] man traction  []Jt manipulation:Gr I [] II []  III [] IV[]   [] OP with REIL    []   Treatment Area:   L shoulder/ scap region   Rationale:      decrease pain, increase ROM, increase tissue extensibility and decrease trigger points to improve patient's ability to perform ADLS    25 min Therapeutic Exercise:  [x]  See flow sheet   Rationale:      increase ROM and increase strength to improve the patients ability to perfrom ADLs         Billed With/As:   [x] TE   [] TA   [] Neuro   [] Self Care Patient Education: [x] Review HEP    [x] Progressed/Changed HEP based on:   [] positioning   [] body mechanics   [] transfers   [] heat/ice application    [] other:        Other Objective/Functional Measures: Added AROM flex/ scap. Full AROM  Added dorrway ER stretch   Post Treatment Pain Level (on 0 to 10) scale:   0  / 10     ASSESSMENT  Assessment/Changes in Function:      Functional improvement:  progressing to full AROM fro OH reach  Functional limitation:  no lifting           Patient will continue to benefit from skilled PT services to modify and progress therapeutic interventions, address functional mobility deficits, address ROM deficits, address strength deficits, analyze and address soft tissue restrictions and analyze and cue movement patterns to attain remaining goals.    Progress toward goals / Updated goals:    Good Progress to    [] STG    [x] LTG  4 as shown by observation         []  See Progress Note/Recertification    PLAN    [x]  Upgrade activities as tolerated {YES) Continue plan of care   []  Discharge due to :    []  Other:      Therapist: Mathew Orona PT    Date: 11/2/2020 Time: 10:15 AM     Future Appointments   Date Time Provider Tammy Bey   11/4/2020 10:30 AM Rafael Crain PT [facility]   11/9/2020 10:15 AM Rafael Crain PT [facility]   11/11/2020 10:30 AM Rafael Crain PT [facility]

## 2020-11-04 ENCOUNTER — APPOINTMENT (OUTPATIENT)
Dept: PHYSICAL THERAPY | Age: 25
End: 2020-11-04
Payer: COMMERCIAL

## 2020-11-06 ENCOUNTER — APPOINTMENT (OUTPATIENT)
Dept: PHYSICAL THERAPY | Age: 25
End: 2020-11-06
Payer: COMMERCIAL

## 2020-11-09 ENCOUNTER — HOSPITAL ENCOUNTER (OUTPATIENT)
Dept: PHYSICAL THERAPY | Age: 25
Discharge: HOME | End: 2020-11-09
Payer: COMMERCIAL

## 2020-11-09 PROCEDURE — 97140 MANUAL THERAPY 1/> REGIONS: CPT

## 2020-11-09 PROCEDURE — 97110 THERAPEUTIC EXERCISES: CPT

## 2020-11-09 NOTE — PROGRESS NOTES
PHYSICAL THERAPY - DAILY TREATMENT NOTE     Patient Name: Carter Longoria        Date: 2020  : 1995   YES Patient  Verified  Visit #:     Insurance: Payor: Sunni Hunter / Plan: Poppy SANTIAGO / Product Type: Commerical /      In time: 2564 Out time: 1110   Total Treatment Time: 55     Medicare/BCBS Time Tracking (below)   Total Timed Codes (min):   1:1 Treatment Time:       TREATMENT AREA =  Left shoulder pain [M25.512]    SUBJECTIVE    Pain Level (on 0 to 10 scale):  0  / 10   Medication Changes/New allergies or changes in medical history, any new surgeries or procedures?     NO    If yes, update Summary List   Subjective Functional Status/Changes:  []  No changes reported   Muscles are sore from working a lot of hours at work           OBJECTIVE  Modalities Rationale:     decrease inflammation and decrease pain to improve patient's ability to perform ADLs without soreness      min [] Estim, type/location:                                      []  att     []  unatt     []  w/US     []  w/ice    []  w/heat    min []  Mechanical Traction: type/lbs                   []  pro   []  sup   []  int   []  cont    []  before manual    []  after manual    min []  Ultrasound, settings/location:      min []  Iontophoresis w/ dexamethasone, location:                                               []  take home patch       []  in clinic   10 min [x]  Ice     []  Heat    location/position:     min []  Vasopneumatic Device, press/temp:     min []  Other:    [x] Skin assessment post-treatment (if applicable):    [x]  intact    []  redness- no adverse reaction     []redness  adverse reaction:      10 min Manual Therapy: Technique:      [x] S/DTM []IASTM []PROM [x] Passive Stretching   [x]manual TPR  [] SOR [] man traction  []Jt manipulation:Gr I [] II []  III [] IV[]   [] OP with REIL    []   Treatment Area:  L shoulder region   Rationale:      decrease pain, increase ROM, increase tissue extensibility and decrease trigger points to improve patient's ability to perform ADLs/ material handling    35 min Therapeutic Exercise:  [x]  See flow sheet   Rationale:      increase ROM and increase strength to improve the patients ability to perform ADLs/ material handling         Billed With/As:   [x] TE   [] TA   [] Neuro   [] Self Care Patient Education: [x] Review HEP    [] Progressed/Changed HEP based on:   [] positioning   [] body mechanics   [] transfers   [] heat/ice application    [] other:        Other Objective/Functional Measures:    Emphasis on limiting AROM/ resistive activity    Added body blade    Inc reps as noted   Post Treatment Pain Level (on 0 to 10) scale:   0  / 10     ASSESSMENT  Assessment/Changes in Function:      Functional improvement:  no difficulty with work activity  Functional limitation:  no lifting/ resistive activity per protocol           Patient will continue to benefit from skilled PT services to modify and progress therapeutic interventions, address functional mobility deficits, address ROM deficits, address strength deficits, analyze and address soft tissue restrictions and analyze and cue movement patterns to attain remaining goals.    Progress toward goals / Updated goals:    Good Progress to    Return to function         []  See Progress Note/Recertification    PLAN    [x]  Upgrade activities as tolerated {YES) Continue plan of care   []  Discharge due to :    []  Other:      Therapist: Jasson Andrea PT    Date: 11/9/2020 Time: 10:17 AM     Future Appointments   Date Time Provider Tammy Bey   11/11/2020 10:30 AM Jill Sofia, PT [facility] [address]

## 2020-11-11 ENCOUNTER — HOSPITAL ENCOUNTER (OUTPATIENT)
Dept: PHYSICAL THERAPY | Age: 25
Discharge: HOME | End: 2020-11-11
Payer: COMMERCIAL

## 2020-11-11 PROCEDURE — 97140 MANUAL THERAPY 1/> REGIONS: CPT

## 2020-11-11 PROCEDURE — 97110 THERAPEUTIC EXERCISES: CPT

## 2020-11-11 NOTE — PROGRESS NOTES
PHYSICAL THERAPY - DAILY TREATMENT NOTE     Patient Name: Amos Hernandez        Date: 2020  : 1995   YES Patient  Verified  Visit #:   15   of   18  Insurance: Payor: Bibiana General / Plan: Lakeshia SANTIAGO / Product Type: Commerical /      In time:  Out time: 112   Total Treatment Time: 55     Medicare/BCBS Time Tracking (below)   Total Timed Codes (min):   1:1 Treatment Time:       TREATMENT AREA =  Left shoulder pain [M25.512]    SUBJECTIVE    Pain Level (on 0 to 10 scale):  0  / 10   Medication Changes/New allergies or changes in medical history, any new surgeries or procedures?     NO    If yes, update Summary List   Subjective Functional Status/Changes:  []  No changes reported   No c/o; doing well           OBJECTIVE  Modalities Rationale:     decrease edema, decrease inflammation and decrease pain to improve patient's ability to perform ADLs      min [] Estim, type/location:                                      []  att     []  unatt     []  w/US     []  w/ice    []  w/heat    min []  Mechanical Traction: type/lbs                   []  pro   []  sup   []  int   []  cont    []  before manual    []  after manual    min []  Ultrasound, settings/location:      min []  Iontophoresis w/ dexamethasone, location:                                               []  take home patch       []  in clinic   10 min [x]  Ice     []  Heat    location/position:     min []  Vasopneumatic Device, press/temp:     min []  Other:    [x] Skin assessment post-treatment (if applicable):    [x]  intact    []  redness- no adverse reaction     []redness  adverse reaction:      10 min Manual Therapy: Technique:      [] S/DTM []IASTM []PROM [] Passive Stretching   []manual TPR  [] SOR [] man traction  []Jt manipulation:Gr I [] II []  III [] IV[]   [] OP with REIL    []   Treatment Area:  L shoulder/ scap region   Rationale:      decrease pain, increase ROM, increase tissue extensibility and decrease trigger points to improve patient's ability to perform reach/ material handling    35 min Therapeutic Exercise:  [x]  See flow sheet   Rationale:      increase ROM and increase strength to improve the patients ability to perform reach/ handling         Billed With/As:   [] TE   [] TA   [] Neuro   [] Self Care Patient Education: [x] Review HEP    [] Progressed/Changed HEP based on:   [] positioning   [] body mechanics   [] transfers   [] heat/ice application    [] other:        Other Objective/Functional Measures:  Inc weight with elbow curls/ prone rows, ER in SL, T/I/Y   Post Treatment Pain Level (on 0 to 10) scale:   0  / 10     ASSESSMENT  Assessment/Changes in Function:      Functional improvement:  progressing strength with reaching OH  Functional limitation:  limited by surgical protocol           Patient will continue to benefit from skilled PT services to modify and progress therapeutic interventions, address functional mobility deficits, address ROM deficits, address strength deficits, analyze and address soft tissue restrictions and analyze and cue movement patterns to attain remaining goals. Progress toward goals / Updated goals:    Progressing strength for [facility] reach/ lift/ return to rec activity         []  See Progress Note/Recertification    PLAN    [x]  Upgrade activities as tolerated {YES) Continue plan of care   []  Discharge due to :    []  Other:      Therapist: Cielo Wood PT    Date: 11/11/2020 Time: 10:35 AM   No future appointments.

## 2020-11-16 ENCOUNTER — HOSPITAL ENCOUNTER (OUTPATIENT)
Dept: PHYSICAL THERAPY | Age: 25
Discharge: HOME | End: 2020-11-16
Payer: COMMERCIAL

## 2020-11-16 PROCEDURE — 97110 THERAPEUTIC EXERCISES: CPT

## 2020-11-16 PROCEDURE — 97530 THERAPEUTIC ACTIVITIES: CPT

## 2020-11-16 NOTE — PROGRESS NOTES
PHYSICAL THERAPY - DAILY TREATMENT NOTE     Patient Name: Gisell Phelan        Date: 2020  : 1995   YES Patient  Verified  Visit #:   15   of   18  Insurance: Payor: Natali Landis / Plan: Mikey Goldberg RPN / Product Type: Commerical /      In time: 2:20 Out time: 3:00   Total Treatment Time: 40     Medicare/BCBS Time Tracking (below)   Total Timed Codes (min):   1:1 Treatment Time:       TREATMENT AREA =  Left shoulder pain [M25.512]    SUBJECTIVE    Pain Level (on 0 to 10 scale):  0  / 10   Medication Changes/New allergies or changes in medical history, any new surgeries or procedures?     NO    If yes, update Summary List   Subjective Functional Status/Changes:  []  No changes reported   See PN           OBJECTIVE  Modalities Rationale:     decrease edema, decrease inflammation and decrease pain to improve patient's ability to perform ADLs      min [] Estim, type/location:                                      []  att     []  unatt     []  w/US     []  w/ice    []  w/heat    min []  Mechanical Traction: type/lbs                   []  pro   []  sup   []  int   []  cont    []  before manual    []  after manual    min []  Ultrasound, settings/location:      min []  Iontophoresis w/ dexamethasone, location:                                               []  take home patch       []  in clinic   PD min [x]  Ice     []  Heat    location/position:     min []  Vasopneumatic Device, press/temp:     min []  Other:    [x] Skin assessment post-treatment (if applicable):    [x]  intact    []  redness- no adverse reaction     []redness  adverse reaction:      H min Manual Therapy: Technique:      [x] S/DTM []IASTM []PROM [x] Passive Stretching   [x]manual TPR  [] SOR [] man traction  []Jt manipulation:Gr I [] II []  III [] IV[]   [] OP with REIL    []   Treatment Area:  L shoulder/ scap region   Rationale:      decrease pain, increase ROM, increase tissue extensibility and decrease trigger points to improve patient's ability to perform reach/ material handling    30 min Therapeutic Exercise:  [x]  See flow sheet   Rationale:      increase ROM and increase strength to improve the patients ability to perform reach/ handling       Billed With/As: 10 MIN   [] TE   [x] TA   [] Neuro   [] Self Care Patient Education: [x] Review HEP    [] Progressed/Changed HEP based on:   [] positioning   [] body mechanics   [] transfers   [] heat/ice application    [x] other: FOTO and review goals       Other Objective/Functional Measures: FOTO: 72/100 (up from 29/100 at EVAL)    Left Shoulder AROM: measured in supine  Flexion: 168 deg  Scaption: 140 deg  Abduction: 174 deg  ER: 90 deg  IR: 70 deg   Post Treatment Pain Level (on 0 to 10) scale:   0  / 10     ASSESSMENT  Assessment/Changes in Function: SEE PN              Patient will continue to benefit from skilled PT services to modify and progress therapeutic interventions, address functional mobility deficits, address ROM deficits, address strength deficits, analyze and address soft tissue restrictions and analyze and cue movement patterns to attain remaining goals. New Goals to be achieved in __4-8__  weeks:   1 Patient to report >= 75% improvement in symptoms with ADLs. 70% reported 11/16/20  2 Patient will be independent with finalized HEP/ self maintenance. Every other day compliance 11/16/20  3 Increase FOTO score >= 75% to indicate improved function with use of L UE.  72/100 on 11/16/2020  4 Restore full AROM for improved ADL participation. Limited by protocol. 11/16/2020   5 Increase L UE strength >= 4+/5 for OH reach and RTW. Increasing resistance as able, see flow sheet.  11/16/2020      [x]  See Progress Note/Recertification    PLAN    [x]  Upgrade activities as tolerated {YES) Continue plan of care   []  Discharge due to :    []  Other:      Therapist: DEE DEE Siegel    Date: 11/16/2020 Time: 3:00 PM     Future Appointments   Date Time Provider Tammy Bey 11/18/2020  8:00 AM Nii Méndez, PT [facility]   11/23/2020  9:30 AM Nii Méndez, PT [facility]   11/25/2020  3:15 PM Nii Méndez, PT [facility]   11/30/2020 10:15 AM Nii Méndez, PT [facility]   12/2/2020  8:45 AM Nii Méndez, PT [facility]

## 2020-11-16 NOTE — PROGRESS NOTES
[facility] PHYSICAL THERAPY AT [address] [facility], [address] [place] [address], [address], [address] [place] [address]  Phone: [phone]  Fax: [phone]  PROGRESS NOTE  Patient Name: Leopoldo Canton : 1995   Treatment/Medical Diagnosis: Left shoulder pain [M25.512]   Referral Source: Ksenia May*     Date of Initial Visit: 2020 Attended Visits: 14 Missed Visits: 2     SUMMARY OF TREATMENT  PT has consisted of manual therapy and therapeutic exercises as outlined in surgical protocol; patient education of HEP and surgical precautions. CURRENT STATUS  Pt reports 70% overall improvement with functional ADL's since beginning PT. Pt has no pain. Patient continues to need work on increasing to full AROM,however is limited by protocol at this time; increasing shoulder and scapular strength. Goal/Measure of Progress Goal Met? 1. Patient to report >= 75% improvement in symptoms with ADLs. Status at last Eval: N/A Current Status: 70% improvement Progressing   2. Patient will be independent with finalized HEP/ self maintenance. Status at last Eval: Initiated Current Status: Compliant yes   3. Increase FOTO score >= 75% to indicate improved function with use of L UE. Status at last Eval: 29/100 at eval Current Status: 72/100 Progressing   4. Restore full AROM for improved ADL participation. Status at last Eval: easily attains PROM as allowed by restrictions (phase 1) Current Status: Left Shoulder AROM: measured in supine  Flexion: 168 deg  Scaption: 140 deg  Abduction: 174 deg  ER: 90 deg  IR: 70 deg Progressing     5. Increase L UE strength >= 4+/5 for OH reach and RTW. Status at last Eval: not assessed due to precautions Current Status: Progressing with TB and free weight resistance as tolerated. Progressing     Continue with Current Goals: to be achieved in __4__  weeks:    RECOMMENDATIONS  Continue PT per current POC as directed by MD/ surgical protocol.     If you have any questions/comments please contact us directly at [phone]. Thank you for allowing us to assist in the care of your patient. Therapist Signature: DEE DEE Mcdonald Date: 11/16/2020     Time: 3:12 PM   NOTE TO PHYSICIAN:  PLEASE COMPLETE THE ORDERS BELOW AND FAX TO   [facility] at [place]: [phone]. If you are unable to process this request in 24 hours please contact our office: [phone].    ___ I have read the above report and request that my patient continue as recommended.   ___ I have read the above report and request that my patient continue therapy with the following changes/special instructions:_________________________________________________   ___ I have read the above report and request that my patient be discharged from therapy.      Physician Signature:                   Date:       Time:

## 2020-11-18 ENCOUNTER — HOSPITAL ENCOUNTER (OUTPATIENT)
Dept: PHYSICAL THERAPY | Age: 25
Discharge: HOME | End: 2020-11-18
Payer: COMMERCIAL

## 2020-11-18 PROCEDURE — 97110 THERAPEUTIC EXERCISES: CPT

## 2020-11-18 PROCEDURE — 97140 MANUAL THERAPY 1/> REGIONS: CPT

## 2020-11-18 NOTE — PROGRESS NOTES
PHYSICAL THERAPY - DAILY TREATMENT NOTE     Patient Name: Eleazar Costa        Date: 2020  : 1995   YES Patient  Verified  Visit #:     Insurance: Payor: Bethany Box / Plan: Sara Higgins RPN / Product Type: Commerical /      In time: 800 Out time: 845   Total Treatment Time: 45     Medicare/[facility] Time Tracking (below)   Total Timed Codes (min):   1:1 Treatment Time:       TREATMENT AREA =  Left shoulder pain [M25.512]    SUBJECTIVE    Pain Level (on 0 to 10 scale):  0  / 10   Medication Changes/New allergies or changes in medical history, any new surgeries or procedures?     NO    If yes, update Summary List   Subjective Functional Status/Changes:  []  No changes reported   Only get pain if I move it in certain positions           OBJECTIVE      35 min Manual Therapy: Technique:      [x] S/DTM []IASTM []PROM [x] Passive Stretching   [x]manual TPR  [] SOR [] man traction  []Jt manipulation:Gr I [] II []  III [] IV[]   [] OP with REIL    []   Treatment Area:  L shoulder/ scap area   Rationale:      decrease pain, increase ROM, increase tissue extensibility and decrease trigger points to improve patient's ability to reach/ perform ADLs/ work    10 min Therapeutic Exercise:  [x]  See flow sheet   Rationale:      increase ROM and increase strength to improve the patients ability to perform ADLs/ work/ rec activity       Billed With/As:   [x] TE   [] TA   [] Neuro   [] Self Care Patient Education: [x] Review HEP    [] Progressed/Changed HEP based on:   [] positioning   [] body mechanics   [] transfers   [] heat/ice application    [] other:        Other Objective/Functional Measures:    Inc resistance to BTB, inc weight as noted on flow sheet   Post Treatment Pain Level (on 0 to 10) scale:   0  / 10     ASSESSMENT  Assessment/Changes in Function:      Functional improvement:  increasing strength and range for ADLs/ work  Functional limitation:  has not returned to gym/ sport           Patient will continue to benefit from skilled PT services to modify and progress therapeutic interventions, address functional mobility deficits, address ROM deficits, address strength deficits, analyze and address soft tissue restrictions and analyze and cue movement patterns to attain remaining goals.    Progress toward goals / Updated goals:    Good Progress to    [] STG    [x] LTG  5 as shown by progression of resistance         []  See Progress Note/Recertification    PLAN    [x]  Upgrade activities as tolerated {YES) Continue plan of care   []  Discharge due to :    []  Other:      Therapist: Sofía Barber PT    Date: 11/18/2020 Time: 8:02 AM     Future Appointments   Date Time Provider Tammy Bey   11/23/2020  9:30 AM Olena Feliz PT [facility]   11/25/2020  3:15 PM Olena Feliz PT [facility]   11/30/2020 10:15 AM Olena Feliz PT [facility]   12/2/2020  8:45 AM Olena Feliz PT [facility]

## 2020-11-23 ENCOUNTER — HOSPITAL ENCOUNTER (OUTPATIENT)
Dept: PHYSICAL THERAPY | Age: 25
Discharge: HOME | End: 2020-11-23
Payer: COMMERCIAL

## 2020-11-23 PROCEDURE — 97110 THERAPEUTIC EXERCISES: CPT

## 2020-11-23 PROCEDURE — 97140 MANUAL THERAPY 1/> REGIONS: CPT

## 2020-11-23 NOTE — PROGRESS NOTES
PHYSICAL THERAPY - DAILY TREATMENT NOTE     Patient Name: Melodie Alcaraz        Date: 2020  : 1995   YES Patient  Verified  Visit #:     Insurance: Payor: Donis Grier / Plan: Cuco SANTIAGO / Product Type: Commerical /      In time: 930 Out time: 1020   Total Treatment Time: 50     Medicare/[facility] Time Tracking (below)   Total Timed Codes (min):   1:1 Treatment Time:       TREATMENT AREA =  Left shoulder pain [M25.512]    SUBJECTIVE    Pain Level (on 0 to 10 scale):  0  / 10   Medication Changes/New allergies or changes in medical history, any new surgeries or procedures? NO    If yes, update Summary List   Subjective Functional Status/Changes:  []  No changes reported   Doing well           OBJECTIVE    10 min Manual Therapy: Technique:      [] S/DTM []IASTM []PROM [] Passive Stretching   []manual TPR  [] SOR [] man traction  []Jt manipulation:Gr I [] II []  III [] IV[]   [] OP with REIL    []   Treatment Area:  L shoulder/ scap area      *manual therapy interventions were performed at a separate and distinct time from   the therapeutic activities interventions.    Rationale:      decrease pain, increase ROM, increase tissue extensibility and decrease trigger points to improve patient's ability to perform ADLs/ recreational activity    40 min Therapeutic Exercise:  [x]  See flow sheet   Rationale:      increase ROM and increase strength to improve the patients ability to perform ADLs/ recreational activity       Billed With/As:   [] TE   [] TA   [] Neuro   [] Self Care Patient Education: [x] Review HEP    [] Progressed/Changed HEP based on:   [] positioning   [] body mechanics   [] transfers   [] heat/ice application    [] other:        Other Objective/Functional Measures:    Progressed bicep/ tricep curls--20# with CC   Post Treatment Pain Level (on 0 to 10) scale:   0  / 10     ASSESSMENT  Assessment/Changes in Function:      Functional improvement:  lifting with bicep             Patient will continue to benefit from skilled PT services to modify and progress therapeutic interventions, address functional mobility deficits, address ROM deficits, address strength deficits, analyze and address soft tissue restrictions and analyze and cue movement patterns to attain remaining goals.    Progress toward goals / Updated goals:    Good Progress to    [] STG    [x] LTG  5 as shown by progressing strength         []  See Progress Note/Recertification    PLAN    [x]  Upgrade activities as tolerated {YES) Continue plan of care   []  Discharge due to :    []  Other:      Therapist: Jasson Andrea PT    Date: 11/23/2020 Time: 9:39 AM     Future Appointments   Date Time Provider Tammy Bey   11/25/2020  3:15 PM Jill Sofia PT [facility]   11/30/2020 10:15 AM Jill Sofia PT [facility]   12/2/2020  8:45 AM Jill Sofia PT [facility]

## 2020-11-25 ENCOUNTER — APPOINTMENT (OUTPATIENT)
Dept: PHYSICAL THERAPY | Age: 25
End: 2020-11-25
Payer: COMMERCIAL

## 2020-11-30 ENCOUNTER — APPOINTMENT (OUTPATIENT)
Dept: PHYSICAL THERAPY | Age: 25
End: 2020-11-30
Payer: COMMERCIAL

## 2020-12-02 ENCOUNTER — HOSPITAL ENCOUNTER (OUTPATIENT)
Dept: PHYSICAL THERAPY | Age: 25
Discharge: HOME | End: 2020-12-02
Payer: COMMERCIAL

## 2020-12-02 PROCEDURE — 97110 THERAPEUTIC EXERCISES: CPT

## 2020-12-02 PROCEDURE — 97140 MANUAL THERAPY 1/> REGIONS: CPT

## 2020-12-02 NOTE — PROGRESS NOTES
[facility] PHYSICAL THERAPY AT [address] [facility], [address] [place] [address], [address], [address] [place] [address]  Phone: [phone]  Fax: [phone]  PROGRESS NOTE            Patient Name: Mellisa Still : 1995   Treatment/Medical Diagnosis: Left shoulder pain [M25.512]  Surgery 20   Referral Source: Balbina Valdezdge*       Date of Initial Visit: 2020 Attended Visits: 17 Missed Visits: 3      SUMMARY OF TREATMENT  PT has consisted of manual therapy and therapeutic exercises as outlined in surgical protocol; patient education of HEP and surgical precautions.     CURRENT STATUS  Pt reports 90% overall improvement with functional ADL's since beginning PT. Pt has no pain. Patient demonstrates good ROM and is progressing with strength/ stability.              Goal/Measure of Progress Goal Met? 1. Patient to report >= 75% improvement in symptoms with ADLs.     Status at last Eval: 70% Current Status: 90% improvement yes   2. Patient will be independent with finalized HEP/ self maintenance.    Status at last Eval: Initiated Current Status: Compliant yes   3. Increase FOTO score >= 75% to indicate improved function with use of L UE. Status at last Eval: 72/100 at eval Current Status: 87/100 yes   4. Restore full AROM for improved ADL participation.    Status at last Eval: Left Shoulder AROM: measured in supine  Flexion: 168 deg  Scaption: 140 deg  Abduction: 174 deg  ER: 90 deg  IR: 70 deg Current Status: Left Shoulder AROM: measured in supine  Flexion: 168 deg  Scaption: 170 deg    ER: 90 deg  IR: 77 deg yes              5.  Increase L UE strength >= 4+/5 for OH reach and RTW. Status at last Eval: Progressing with TB and free weight resistance as tolerated. Current Status: 4+/5 throughout yes      Continue with Current Goals: to be achieved in __4__  weeks:  5. Increase strength to 5/5 L UE/ scap strength     RECOMMENDATIONS  Per MD recommendation.   Patient would benefit with continued strengthening/ stabilization for return to sport/ recreational activity     If you have any questions/comments please contact us directly at [phone]. Thank you for allowing us to assist in the care of your patient.     Therapist Signature: Sharon Oconnell Date: 12/2/2020       Time: 9:30 AM   NOTE TO PHYSICIAN:  Isabelle Rice 172 FAX TO   [facility] at [place]: [phone].   If you are unable to process this request in 24 hours please contact our office: [phone].     ___ I have read the above report and request that my patient continue as recommended.   ___ I have read the above report and request that my patient continue therapy with the following changes/special instructions:_________________________________________________   ___ I have read the above report and request that my patient be discharged from therapy.      Physician Signature:                                              Date:                               Time:

## 2020-12-02 NOTE — PROGRESS NOTES
PHYSICAL THERAPY - DAILY TREATMENT NOTE     Patient Name: Lupe Lynn        Date: 2020  : 1995   YES Patient  Verified  Visit #:     Insurance: Payor: Jolene Muro / Plan: Deepali SANTIAGO / Product Type: Commerical /      In time: 840 Out time: 930   Total Treatment Time: 50     Medicare/[facility] Time Tracking (below)   Total Timed Codes (min):   1:1 Treatment Time:       TREATMENT AREA =  Left shoulder pain [M25.512]    SUBJECTIVE    Pain Level (on 0 to 10 scale):  0  / 10   Medication Changes/New allergies or changes in medical history, any new surgeries or procedures? NO    If yes, update Summary List   Subjective Functional Status/Changes:  []  No changes reported   See PN           OBJECTIVE      10 min Manual Therapy: Technique:      [] S/DTM []IASTM []PROM [] Passive Stretching   []manual TPR  [] SOR [] man traction  []Jt manipulation:Gr I [] II []  III [] IV[]   [] OP with REIL    []   Treatment Area:        *manual therapy interventions were performed at a separate and distinct time from   the therapeutic activities interventions.    Rationale:      decrease pain, increase ROM, increase tissue extensibility and decrease trigger points to improve patient's ability to perform ADLs/ rec/ work activity    40 min Therapeutic Exercise:  [x]  See flow sheet   Rationale:      increase ROM and increase strength to improve the patients ability to perform ADLs/ rec/ work activity         Billed With/As:   [x] TE   [] TA   [] Neuro   [] Self Care Patient Education: [x] Review HEP    [] Progressed/Changed HEP based on:   [] positioning   [] body mechanics   [] transfers   [] heat/ice application    [] other:        Other Objective/Functional Measures:    Left Shoulder AROM: measured in supine  Flexion: 168 deg  Scaption: 170 deg  ER: 90 deg  IR: 77 deg   Post Treatment Pain Level (on 0 to 10) scale:   0  / 10     ASSESSMENT  X see Progress Note/Recertification    PLAN    [x]  Upgrade activities as tolerated {YES) Continue plan of care   []  Discharge due to :    []  Other:      Therapist: Telma Cummings PT    Date: 12/2/2020 Time: 8:44 AM     Future Appointments   Date Time Provider Tamym Bey   12/2/2020  8:45 AM Walter Becerra PT [facility]

## 2021-01-14 NOTE — PROGRESS NOTES
[facility] PHYSICAL THERAPY AT [facility] [address] [place], [address]  Phone: [phone]  Fax: [phone]  PT DISCHARGE NOTE                    Patient Name: Shonda Mercer : 1995   Treatment/Medical Diagnosis: Left shoulder pain [M25.512]  Surgery 20   Referral Source: America Eliaslan*       Date of Initial Visit: 2020 Attended Visits: 17 Missed Visits: 3      SUMMARY OF TREATMENT  PT has consisted of manual therapy and therapeutic exercises as outlined in surgical protocol; patient education of HEP and surgical precautions.     CURRENT STATUS  Pt reports 90% overall improvement with functional ADL's since beginning PT. Pt has no pain.    Patient demonstrates good ROM and is progressing with strength/ stability.                    Goal/Measure of Progress Goal Met? 1.  Patient to report >= 75% improvement in symptoms with ADLs.     Status at last Eval: 70% Current Status: 90% improvement yes   2.  Patient will be independent with finalized HEP/ self maintenance.    Status at last Eval: Initiated Current Status: Compliant yes   3.  Increase FOTO score >= 75% to indicate improved function with use of L UE. Status at last Eval: 72/100 at eval Current Status: 87/100 yes   4.  Restore full AROM for improved ADL participation.    Status at last Eval: Left Shoulder AROM: measured in supine  Flexion: 168 deg  Scaption: 140 deg  Abduction: 174 deg  ER: 90 deg  IR: 70 deg Current Status: Left Shoulder AROM: measured in supine  Flexion: 168 deg  Scaption: 170 deg     ER: 90 deg  IR: 77 deg yes                    5.  Increase L UE strength >= 4+/5 for OH reach and RTW. Status at last Eval: Progressing with TB and free weight resistance as tolerated. Current Status: 4+/5 throughout yes      Continue with Current Goals: to be achieved in __4__  weeks:  5.  Increase strength to 5/5 L UE/ scap strength     RECOMMENDATIONS  Discharge per Per MD recommendation.       If you have any questions/comments please contact us directly at [phone].    Thank you for allowing us to assist in the care of your patient.     Therapist Signature: Yuri Larson PT Date: 1/14/21       Time: 9:30 AM